# Patient Record
Sex: FEMALE | Race: OTHER
[De-identification: names, ages, dates, MRNs, and addresses within clinical notes are randomized per-mention and may not be internally consistent; named-entity substitution may affect disease eponyms.]

---

## 2017-05-26 ENCOUNTER — HOSPITAL ENCOUNTER (EMERGENCY)
Dept: HOSPITAL 8 - ED | Age: 42
Discharge: HOME | End: 2017-05-26
Payer: COMMERCIAL

## 2017-05-26 ENCOUNTER — OFFICE VISIT (OUTPATIENT)
Dept: URGENT CARE | Facility: CLINIC | Age: 42
End: 2017-05-26
Payer: COMMERCIAL

## 2017-05-26 VITALS
HEIGHT: 60 IN | TEMPERATURE: 98.1 F | RESPIRATION RATE: 18 BRPM | OXYGEN SATURATION: 99 % | SYSTOLIC BLOOD PRESSURE: 110 MMHG | BODY MASS INDEX: 26.5 KG/M2 | DIASTOLIC BLOOD PRESSURE: 60 MMHG | HEART RATE: 88 BPM | WEIGHT: 135 LBS

## 2017-05-26 VITALS — DIASTOLIC BLOOD PRESSURE: 90 MMHG | SYSTOLIC BLOOD PRESSURE: 132 MMHG

## 2017-05-26 VITALS — HEIGHT: 60 IN | BODY MASS INDEX: 26.27 KG/M2 | WEIGHT: 133.82 LBS
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
## 2017-05-26 DIAGNOSIS — S46.811A: Primary | ICD-10-CM

## 2017-05-26 DIAGNOSIS — M79.601 RIGHT ARM PAIN: ICD-10-CM
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
## 2017-05-26 DIAGNOSIS — S16.1XXA NECK MUSCLE STRAIN, INITIAL ENCOUNTER: ICD-10-CM
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
## 2017-05-26 LAB — BUN SERPL-MCNC: 11 MG/DL (ref 7–18)

## 2017-05-26 PROCEDURE — 36415 COLL VENOUS BLD VENIPUNCTURE: CPT

## 2017-05-26 PROCEDURE — 85025 COMPLETE CBC W/AUTO DIFF WBC: CPT

## 2017-05-26 PROCEDURE — 82040 ASSAY OF SERUM ALBUMIN: CPT

## 2017-05-26 PROCEDURE — 99203 OFFICE O/P NEW LOW 30 MIN: CPT | Performed by: NURSE PRACTITIONER

## 2017-05-26 PROCEDURE — 80048 BASIC METABOLIC PNL TOTAL CA: CPT

## 2017-05-26 PROCEDURE — 72125 CT NECK SPINE W/O DYE: CPT

## 2017-05-26 RX ORDER — HYDROCODONE BITARTRATE AND ACETAMINOPHEN 5; 325 MG/1; MG/1
1-2 TABLET ORAL EVERY 6 HOURS PRN
Qty: 15 TAB | Refills: 0 | Status: SHIPPED | OUTPATIENT
Start: 2017-05-26

## 2017-05-26 RX ORDER — PREDNISONE 10 MG/1
TABLET ORAL
Qty: 15 TAB | Refills: 0 | Status: SHIPPED | OUTPATIENT
Start: 2017-05-26

## 2017-05-26 RX ORDER — IBUPROFEN 200 MG
200 TABLET ORAL EVERY 6 HOURS PRN
COMMUNITY

## 2017-05-26 RX ORDER — CYCLOBENZAPRINE HCL 10 MG
10 TABLET ORAL 3 TIMES DAILY PRN
Qty: 30 TAB | Refills: 0 | Status: SHIPPED | OUTPATIENT
Start: 2017-05-26

## 2017-05-26 ASSESSMENT — ENCOUNTER SYMPTOMS
BACK PAIN: 1
NECK PAIN: 1
FEVER: 0
CHILLS: 0

## 2017-05-26 NOTE — MR AVS SNAPSHOT
Kirsten Banks   2017 3:30 PM   Office Visit   MRN: [id number]    Department:  [facility]   Dept Phone:  [phone]    Description:  Female : 1975   Provider:  Cathey J Hamman, A.P.N.           Reason for Visit     Neck Pain x 2 day hurts to turn neck that moves into right arm      Allergies as of 2017     No Known Allergies      You were diagnosed with     Neck muscle strain, initial encounter   [630677]       Right arm pain   [145210]         Vital Signs     Blood Pressure Pulse Temperature Respirations Height Weight    110/60 mmHg 88 36.7 °C (98.1 °F) 18 1.524 m (5') 61.236 kg (135 lb)    Body Mass Index Oxygen Saturation Breastfeeding?             26.37 kg/m2 99% No         Basic Information     Date Of Birth Sex Race Ethnicity Preferred Language    1975 Female Unknown Non- English      Health Maintenance        Date Due Completion Dates    IMM DTaP/Tdap/Td Vaccine (1 - Tdap) 1994 ---    PAP SMEAR 1996 ---    MAMMOGRAM 2015 8/10/2005            Current Immunizations     No immunizations on file.      Below and/or attached are the medications your provider expects you to take. Review all of your home medications and newly ordered medications with your provider and/or pharmacist. Follow medication instructions as directed by your provider and/or pharmacist. Please keep your medication list with you and share with your provider. Update the information when medications are discontinued, doses are changed, or new medications (including over-the-counter products) are added; and carry medication information at all times in the event of emergency situations     Allergies:  No Known Allergies          Medications  Valid as of: May 26, 2017 -  3:55 PM    Generic Name Brand Name Tablet Size Instructions for use    Cyclobenzaprine HCl (Tab) FLEXERIL 10 MG Take 1 Tab by mouth 3 times a day as needed.        Hydrocodone-Acetaminophen (Tab) NORCO 5-325 MG Take 1-2  Tabs by mouth every 6 hours as needed.        Ibuprofen (Tab) MOTRIN 200 MG Take 200 mg by mouth every 6 hours as needed.        PredniSONE (Tab) DELTASONE 10 MG Take 1 tablet 3 times daily for 5 days.        .                 Medicines prescribed today were sent to:     [facility][id number] - [place], [place] - [address] AT [address] [place] [address]    Phone: [phone] Fax: [phone]    Open 24 Hours?: No      Medication refill instructions:       If your prescription bottle indicates you have medication refills left, it is not necessary to call your provider’s office. Please contact your pharmacy and they will refill your medication.    If your prescription bottle indicates you do not have any refills left, you may request refills at any time through one of the following ways: The online Looxii system (except Urgent Care), by calling your provider’s office, or by asking your pharmacy to contact your provider’s office with a refill request. Medication refills are processed only during regular business hours and may not be available until the next business day. Your provider may request additional information or to have a follow-up visit with you prior to refilling your medication.   *Please Note: Medication refills are assigned a new Rx number when refilled electronically. Your pharmacy may indicate that no refills were authorized even though a new prescription for the same medication is available at the pharmacy. Please request the medicine by name with the pharmacy before contacting your provider for a refill.           Looxii Access Code: [email or web address]  Expires: 6/25/2017  3:27 PM    Your email address is not on file at [employer]  Email Addresses are required for you to sign up for Looxii, please contact [phone] to verify your personal information and to provide your email address prior to attempting to register for Looxii.    Kirst[place] [address]    Dealstreett  A secure, online tool to manage your health information     [employer]’s Dealstreett® is a secure, online tool that connects you to your personalized health information from the privacy of your home -- day or night - making it very easy for you to manage your healthcare. Once the activation process is completed, you can even access your medical information using the Spinal Modulation alexis, which is available for free in the Apple Alexis store or Google Play store.     To learn more about Spinal Modulation, visit [email or web address]    There are two levels of access available (as shown below):   My Chart Features  [facility] Primary Care Doctor [facility]  Specialists [facility]  Urgent  Care Non-[facility] Primary Care Doctor   Email your healthcare team securely and privately 24/7 X X X    Manage appointments: schedule your next appointment; view details of past/upcoming appointments X      Request prescription refills. X      View recent personal medical records, including lab and immunizations X X X X   View health record, including health history, allergies, medications X X X X   Read reports about your outpatient visits, procedures, consult and ER notes X X X X   See your discharge summary, which is a recap of your hospital and/or ER visit that includes your diagnosis, lab results, and care plan X X  X     How to register for Spinal Modulation:  Once your e-mail address has been verified, follow the following steps to sign up for Spinal Modulation.     1. Go to  [email or web address]  2. Click on the Sign Up Now box, which takes you to the New Member Sign Up page. You will need to provide the following information:  a. Enter your Spinal Modulation Access Code exactly as it appears at the top of this page. (You will not need to use this code after you’ve completed the sign-up process. If you do not sign up before the expiration date, you must request a new code.)   b. Enter your date of birth.   c. Enter your home email address.    d. Click Submit, and follow the next screen’s instructions.  3. Create a [employer] ID. This will be your [employer] login ID and cannot be changed, so think of one that is secure and easy to remember.  4. Create a [employer] password. You can change your password at any time.  5. Enter your Password Reset Question and Answer. This can be used at a later time if you forget your password.   6. Enter your e-mail address. This allows you to receive e-mail notifications when new information is available in Send Word Now.  7. Click Sign Up. You can now view your health information.    For assistance activating your [employer] Word Now account, call [phone]

## 2020-02-14 NOTE — PROGRESS NOTES
PT AWAKE, PT REFUSED AM LAB DRAW, RISK AND BENEFITS EXPLAINED, STILL REFUSING, COFFEE 
PROVIDED PER REQUEST, ALL NEEDS ATTENDED. Subjective:      Kirsten Banks is a 42 y.o. female who presents with Neck Pain    PMH: no history of chronic illness    Social hx: non-smoker    Family hx: not pertinent to today's visit    Allergies: Review of patient's allergies indicates no known allergies.    The patient is a 42-year-old female who presents today with complaint of pain to the right side of the neck into the right shoulder and down the right arm. Symptoms started 2 days ago upon waking. Patient states range of motion is painful and pain is now starting to develop in the right mid and lower back as well. No known injury.        Neck Pain   This is a new problem. The current episode started in the past 7 days. The problem occurs constantly. The problem has been gradually worsening. The pain is associated with nothing. The pain is present in the right side. The quality of the pain is described as aching. The pain is moderate. The symptoms are aggravated by twisting. The pain is same all the time. Stiffness is present all day. Pertinent negatives include no fever. She has tried NSAIDs, ice and heat for the symptoms. The treatment provided no relief.       Review of Systems   Constitutional: Negative for fever and chills.   Musculoskeletal: Positive for back pain and neck pain.        Right arm pain     All other systems reviewed and are negative      Objective:     /60 mmHg  Pulse 88  Temp(Src) 36.7 °C (98.1 °F)  Resp 18  Ht 1.524 m (5')  Wt 61.236 kg (135 lb)  BMI 26.37 kg/m2  SpO2 99%  Breastfeeding? No     Physical Exam   Constitutional: She is oriented to person, place, and time. She appears well-developed and well-nourished. No distress.   Neck:       Point tenderness to the right side of the neck and upper back. No weakness in the upper extremities at this time.   Neurological: She is alert and oriented to person, place, and time.   Skin: Skin is warm and dry. She is not diaphoretic.   Psychiatric: She has a normal mood and  affect. Her behavior is normal.   Vitals reviewed.              Assessment/Plan:   Neck muscle strain  Right arm pain  -Discontinue over-the-counter NSAIDs  -Prednisone 10 mg 3 times daily for 5 days, clearly explained to the patient not to take any over-the-counter Advil, Motrin, ibuprofen, Aleve, or aspirin while taking the prednisone  - Flexeril 3 times a day as needed, clearly stated no driving or alcohol with this medication  -Norco when necessary at night only, clearly states no driving or alcohol with this medication  Alternate ice and heat, always end with ice  -Follow up if symptoms persist or worsen-  There are no diagnoses linked to this encounter.